# Patient Record
Sex: FEMALE | Race: WHITE | NOT HISPANIC OR LATINO | URBAN - NONMETROPOLITAN AREA
[De-identification: names, ages, dates, MRNs, and addresses within clinical notes are randomized per-mention and may not be internally consistent; named-entity substitution may affect disease eponyms.]

---

## 2019-03-18 ENCOUNTER — IMPORTED ENCOUNTER (OUTPATIENT)
Dept: URBAN - NONMETROPOLITAN AREA CLINIC 1 | Facility: CLINIC | Age: 84
End: 2019-03-18

## 2019-03-18 PROBLEM — H35.3222: Noted: 2019-03-18

## 2019-03-18 PROBLEM — H40.1232: Noted: 2019-03-18

## 2019-03-18 PROBLEM — H35.3213: Noted: 2019-03-18

## 2019-03-18 PROCEDURE — 92020 GONIOSCOPY: CPT

## 2019-03-18 PROCEDURE — 92015 DETERMINE REFRACTIVE STATE: CPT

## 2019-03-18 PROCEDURE — 92012 INTRM OPH EXAM EST PATIENT: CPT

## 2019-03-18 NOTE — PATIENT DISCUSSION
Pt request refraction today 3/18/19 for RX for galsses because current pair scratched. LTG OU.-Discussed findings of exam in detail with the patient. 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 .- Large mac scar OD.-Pt still recieving injections OS per BAM but it has been extended from [id number] to Q3mons.-Continue to f/u with Zoya. PC IOL OU stable in good position. Open Caps OU.; Dr's Notes: MAC OCT - 11/15/17[facility] OCT - 2/24/17VF-               10/25/16 OS OnlyDFE-             5/2/18

## 2021-03-01 ENCOUNTER — IMPORTED ENCOUNTER (OUTPATIENT)
Dept: URBAN - NONMETROPOLITAN AREA CLINIC 1 | Facility: CLINIC | Age: 86
End: 2021-03-01

## 2021-03-01 PROCEDURE — 92012 INTRM OPH EXAM EST PATIENT: CPT

## 2021-03-01 NOTE — PATIENT DISCUSSION
Neovascular AMD w/ Active CNV OS - Patient has been receiving injections (Lucentis) Q12 weeks since 2016 w/ Dr. Aby Godoy LTG OU- Discussed findings of exam in detail with the patient. - IOP today 11 OD 17 OS- Continue Dorzolamide OU but increase from BID to TID - RTC in 3 months for follow up to check IOPPseudophakia OU - Intraocular lenses are stable and in place- Open Capsule OU; Dr's Notes: MAC OCT - 11/15/17[facility] OCT - 2/24/17VF-               10/25/16 OS OnlyDFE-             5/2/18

## 2021-07-12 ENCOUNTER — IMPORTED ENCOUNTER (OUTPATIENT)
Dept: URBAN - NONMETROPOLITAN AREA CLINIC 1 | Facility: CLINIC | Age: 86
End: 2021-07-12

## 2021-07-12 PROCEDURE — 92012 INTRM OPH EXAM EST PATIENT: CPT

## 2021-07-12 NOTE — PATIENT DISCUSSION
Neovascular AMD w/ Active CNV OS - Patient has been receiving injections (Lucentis) Q12 weeks since 2016 w/ Dr. Ishan Herman LT OU- Discussed findings of exam in detail with the patient. - IOP today 11 OD 11 OS- Continue Dorzolamide OU TID - RTC in 3 months for follow up to check IOPPseudophakia OU - Intraocular lenses are stable and in place- Open Capsule OU; Dr's Notes: MAC OCT - 11/15/17[facility] OCT - 2/24/17VF-               10/25/16 OS OnlyDFE-             5/2/18

## 2021-10-11 ENCOUNTER — IMPORTED ENCOUNTER (OUTPATIENT)
Dept: URBAN - NONMETROPOLITAN AREA CLINIC 1 | Facility: CLINIC | Age: 86
End: 2021-10-11

## 2021-10-11 PROCEDURE — 92012 INTRM OPH EXAM EST PATIENT: CPT

## 2021-10-11 NOTE — PATIENT DISCUSSION
Neovascular AMD w/ Active CNV OS - Patient has been receiving injections (Lucentis) Q12 weeks since 2016 w/ Dr. Johnson Christensen LTG OU- Discussed findings of exam in detail with the patient. - IOP 11 OU stable- Continue Dorzolamide OU TID Pseudophakia OU - Intraocular lenses are stable and in place- Open Capsule OU3 month IOP check; Dr's Notes: MAC OCT - 11/15/17[facility] OCT - 2/24/17VF-               10/25/16 OS OnlyDFE-             5/2/18

## 2022-02-14 ENCOUNTER — FOLLOW UP (OUTPATIENT)
Dept: RURAL CLINIC 1 | Facility: CLINIC | Age: 87
End: 2022-02-14

## 2022-02-14 DIAGNOSIS — H40.1232: ICD-10-CM

## 2022-02-14 PROCEDURE — 92012 INTRM OPH EXAM EST PATIENT: CPT

## 2022-02-14 ASSESSMENT — TONOMETRY
OD_IOP_MMHG: 11
OS_IOP_MMHG: 10

## 2022-02-14 ASSESSMENT — VISUAL ACUITY
OU_CC: 20/25
OS_CC: 20/25

## 2022-04-09 ASSESSMENT — VISUAL ACUITY
OS_CC: 20/30
OS_SC: 20/25
OS_SC: 20/25-2
OD_SC: CF2'
OS_SC: 20/30
OS_SC: 20/25

## 2022-04-09 ASSESSMENT — TONOMETRY
OS_IOP_MMHG: 14
OS_IOP_MMHG: 11
OS_IOP_MMHG: 11
OD_IOP_MMHG: 11
OD_IOP_MMHG: 13
OS_IOP_MMHG: 17
OD_IOP_MMHG: 11
OD_IOP_MMHG: 11

## 2022-04-09 ASSESSMENT — PACHYMETRY
OS_CT_UM: 526; ADJ: THIN
OS_CT_UM: 526; ADJ: THIN
OD_CT_UM: 525; ADJ: THIN
OS_CT_UM: 526; ADJ: THIN
OD_CT_UM: 525; ADJ: THIN
OS_CT_UM: 526; ADJ: THIN
OD_CT_UM: 525; ADJ: THIN
OD_CT_UM: 525; ADJ: THIN

## 2022-08-08 ENCOUNTER — FOLLOW UP (OUTPATIENT)
Dept: RURAL CLINIC 1 | Facility: CLINIC | Age: 87
End: 2022-08-08

## 2022-08-08 DIAGNOSIS — H35.3221: ICD-10-CM

## 2022-08-08 DIAGNOSIS — H40.1232: ICD-10-CM

## 2022-08-08 DIAGNOSIS — H35.3213: ICD-10-CM

## 2022-08-08 PROCEDURE — 92015 DETERMINE REFRACTIVE STATE: CPT

## 2022-08-08 PROCEDURE — 92134 CPTRZ OPH DX IMG PST SGM RTA: CPT

## 2022-08-08 PROCEDURE — 92014 COMPRE OPH EXAM EST PT 1/>: CPT

## 2022-08-08 ASSESSMENT — VISUAL ACUITY
OU_CC: 20/40
OS_CC: 20/25-2

## 2022-08-08 ASSESSMENT — TONOMETRY
OS_IOP_MMHG: 13
OD_IOP_MMHG: 13